# Patient Record
Sex: FEMALE | Race: WHITE
[De-identification: names, ages, dates, MRNs, and addresses within clinical notes are randomized per-mention and may not be internally consistent; named-entity substitution may affect disease eponyms.]

---

## 2017-08-28 ENCOUNTER — HOSPITAL ENCOUNTER (OUTPATIENT)
Dept: HOSPITAL 45 - C.LAB1850 | Age: 72
Discharge: HOME | End: 2017-08-28
Attending: INTERNAL MEDICINE
Payer: COMMERCIAL

## 2017-08-28 DIAGNOSIS — E78.5: ICD-10-CM

## 2017-08-28 DIAGNOSIS — I10: Primary | ICD-10-CM

## 2017-08-28 DIAGNOSIS — E89.0: ICD-10-CM

## 2017-08-28 DIAGNOSIS — Z11.59: ICD-10-CM

## 2017-08-28 DIAGNOSIS — D72.819: ICD-10-CM

## 2017-08-28 LAB
ANION GAP SERPL CALC-SCNC: 7 MMOL/L (ref 3–11)
BASOPHILS # BLD: 0.04 K/UL (ref 0–0.2)
BASOPHILS NFR BLD: 0.7 %
BUN SERPL-MCNC: 19 MG/DL (ref 7–18)
BUN/CREAT SERPL: 16.9 (ref 10–20)
CALCIUM SERPL-MCNC: 9.2 MG/DL (ref 8.5–10.1)
CHLORIDE SERPL-SCNC: 104 MMOL/L (ref 98–107)
CHOLEST/HDLC SERPL: 3.3 {RATIO}
CO2 SERPL-SCNC: 26 MMOL/L (ref 21–32)
COMPLETE: YES
CREAT SERPL-MCNC: 1.1 MG/DL (ref 0.6–1.2)
EOSINOPHIL NFR BLD AUTO: 225 K/UL (ref 130–400)
GLUCOSE SERPL-MCNC: 102 MG/DL (ref 70–99)
GLUCOSE UR QL: 77 MG/DL
HCT VFR BLD CALC: 41.2 % (ref 37–47)
IG%: 0.3 %
IMM GRANULOCYTES NFR BLD AUTO: 26.8 %
KETONES UR QL STRIP: 168 MG/DL
LYMPHOCYTES # BLD: 1.55 K/UL (ref 1.2–3.4)
MCH RBC QN AUTO: 30.9 PG (ref 25–34)
MCHC RBC AUTO-ENTMCNC: 34.2 G/DL (ref 32–36)
MCV RBC AUTO: 90.4 FL (ref 80–100)
MONOCYTES NFR BLD: 9.3 %
NEUTROPHILS # BLD AUTO: 3.3 %
NEUTROPHILS NFR BLD AUTO: 59.6 %
NITRITE UR QL STRIP: 61 MG/DL (ref 0–150)
PH UR: 257 MG/DL (ref 0–200)
PMV BLD AUTO: 9.1 FL (ref 7.4–10.4)
POTASSIUM SERPL-SCNC: 4.2 MMOL/L (ref 3.5–5.1)
RBC # BLD AUTO: 4.56 M/UL (ref 4.2–5.4)
SODIUM SERPL-SCNC: 137 MMOL/L (ref 136–145)
TSH SERPL-ACNC: 0.22 UIU/ML (ref 0.3–4.5)
VERY LOW DENSITY LIPOPROT CALC: 12 MG/DL
WBC # BLD AUTO: 5.79 K/UL (ref 4.8–10.8)

## 2017-09-19 ENCOUNTER — HOSPITAL ENCOUNTER (OUTPATIENT)
Dept: HOSPITAL 45 - C.MAMM | Age: 72
Discharge: HOME | End: 2017-09-19
Attending: OBSTETRICS & GYNECOLOGY
Payer: COMMERCIAL

## 2017-09-19 DIAGNOSIS — Z12.31: Primary | ICD-10-CM

## 2017-09-20 NOTE — MAMMOGRAPHY REPORT
BILATERAL DIGITAL SCREENING MAMMOGRAM WITH CAD: 9/19/2017

CLINICAL HISTORY: Routine screening.  Patient has no complaints.  





TECHNIQUE:  Bilateral CC and MLO views were obtained.  Current study was also evaluated with a Comput
er Aided Detection (CAD) system.  



COMPARISON: Comparison is made to exams dated:  9/15/2016 mammogram, 9/10/2015 mammogram, 9/5/2014 ma
mmogram, 8/29/2013 mammogram, 8/28/2012 mammogram, and 8/24/2011 mammogram - [facility]
nter.   



BREAST COMPOSITION:  The tissue of both breasts is almost entirely fatty.  



FINDINGS:  There are mild vascular calcifications in both breasts.  No suspicious mass, architectural
 distortion or cluster of microcalcifications is seen.  



IMPRESSION:  ACR BI-RADS CATEGORY 2: BENIGN

There is no mammographic evidence of malignancy. A 1 year screening mammogram is recommended.  The pa
tient will receive written notification of the results.  





Approximately 10% of breast cancers are not detected with mammography. A negative mammographic report
 should not delay biopsy if a clinically suggestive mass is present.



Bridgette Nation M.D.          

ay/:9/19/2017 14:57:58  



Imaging Technologist: Shea SANDOVAL(R)(M), [facility]

letter sent: Normal 1/2  

BI-RADS Code: ACR BI-RADS Category 2: Benign

## 2017-09-22 ENCOUNTER — HOSPITAL ENCOUNTER (OUTPATIENT)
Dept: HOSPITAL 45 - C.PAPS | Age: 72
Discharge: HOME | End: 2017-09-22
Attending: OBSTETRICS & GYNECOLOGY
Payer: COMMERCIAL

## 2017-09-22 DIAGNOSIS — Z85.42: Primary | ICD-10-CM

## 2017-10-26 ENCOUNTER — HOSPITAL ENCOUNTER (OUTPATIENT)
Dept: HOSPITAL 45 - C.LAB1850 | Age: 72
Discharge: HOME | End: 2017-10-26
Attending: INTERNAL MEDICINE
Payer: COMMERCIAL

## 2017-10-26 DIAGNOSIS — I10: Primary | ICD-10-CM

## 2018-02-01 ENCOUNTER — HOSPITAL ENCOUNTER (OUTPATIENT)
Dept: HOSPITAL 45 - C.GI | Age: 73
Discharge: HOME | End: 2018-02-01
Attending: INTERNAL MEDICINE
Payer: COMMERCIAL

## 2018-02-01 VITALS
WEIGHT: 183.38 LBS | HEIGHT: 62.01 IN | BODY MASS INDEX: 33.32 KG/M2 | WEIGHT: 183.38 LBS | BODY MASS INDEX: 33.32 KG/M2 | HEIGHT: 62.01 IN

## 2018-02-01 VITALS — HEART RATE: 61 BPM | SYSTOLIC BLOOD PRESSURE: 121 MMHG | DIASTOLIC BLOOD PRESSURE: 61 MMHG | OXYGEN SATURATION: 99 %

## 2018-02-01 DIAGNOSIS — K57.30: ICD-10-CM

## 2018-02-01 DIAGNOSIS — K64.8: ICD-10-CM

## 2018-02-01 DIAGNOSIS — Z12.11: Primary | ICD-10-CM

## 2018-02-01 DIAGNOSIS — Z79.82: ICD-10-CM

## 2018-02-01 DIAGNOSIS — Z79.899: ICD-10-CM

## 2018-02-01 DIAGNOSIS — Z80.0: ICD-10-CM

## 2018-02-01 NOTE — GI REPORT
Procedure Date: 2/1/2018 1:24 PM

Procedure:            Colonoscopy

Indications:          Family history of colon cancer in a first-degree 

                      relative

Medicines:            Monitored Anesthesia Care

Complications:        No immediate complications.

Estimated Blood Loss: Estimated blood loss: none.

Procedure:            Pre-Anesthesia Assessment:

                      - Prior to the procedure, a History and Physical was 

                      performed, and patient medications and allergies were 

                      reviewed. The patient's tolerance of previous 

                      anesthesia was also reviewed. The risks and benefits of 

                      the procedure and the sedation options and risks were 

                      discussed with the patient. All questions were 

                      answered, and informed consent was obtained. Prior 

                      Anticoagulants: The patient has taken aspirin, last 

                      dose was 1 day prior to procedure. ASA Grade 

                      Assessment: II - A patient with mild systemic disease. 

                      After reviewing the risks and benefits, the patient was 

                      deemed in satisfactory condition to undergo the 

                      procedure.

                      After I obtained informed consent, the scope was passed 

                      under direct vision. Throughout the procedure, the 

                      patient's blood pressure, pulse, and oxygen saturations 

                      were monitored continuously. The scope was introduced 

                      through the anus and advanced to the terminal ileum. 

                      The colonoscopy was performed without difficulty. The 

                      patient tolerated the procedure well. The quality of 

                      the bowel preparation was good. The terminal ileum, 

                      ileocecal valve, appendiceal orifice, and rectum were 

                      photographed.

Findings:

     The perianal and digital rectal examinations were normal.

     Multiple small-mouthed diverticula were found in the sigmoid colon.

     Non-bleeding internal hemorrhoids were found during retroflexion. The 

     hemorrhoids were small.

Impression:           - Diverticulosis in the sigmoid colon.

                      - Non-bleeding internal hemorrhoids.

                      - No specimens collected.

Recommendation:       - Resume previous diet.

                      - Continue present medications.

                      - Repeat colonoscopy in 5 years for surveillance.

                      - Return to primary care physician as previously 

                      scheduled.

Jairon Cedeno, DO

2/1/2018 2:11:18 PM

This report has been signed electronically.

Note Initiated On: 2/1/2018 1:24 PM

     I attest to the content of the Intraoperative Record and orders 

     documented therein, exceptions below

## 2018-02-01 NOTE — ANESTHESIOLOGY PROGRESS NOTE
Anesthesia Post Op Note


Date & Time


Feb 1, 2018 at 14:21





Vital Signs


Pain Intensity:  0





Vital Signs Past 12 Hours








  Date Time  Temp Pulse Resp B/P (MAP) Pulse Ox O2 Delivery O2 Flow Rate FiO2


 


2/1/18 13:57  63 16 105/54 (71) 96 Room Air  


 


2/1/18 12:52 36.6 82 20 162/61 (94) 96 Room Air  











Notes


Mental Status:  alert / awake / arousable, participated in evaluation


Pt Amnestic to Procedure:  Yes


Nausea / Vomiting:  adequately controlled


Pain:  adequately controlled


Airway Patency, RR, SpO2:  stable & adequate


BP & HR:  stable & adequate


Hydration State:  stable & adequate


Anesthetic Complications:  no major complications apparent

## 2018-02-01 NOTE — ENDO HISTORY AND PHYSICAL
History & Physical


Date of Service:


Feb 1, 2018.


Chief Complaint:


Screening


Referring Physician:


Dr Muse


History of Present Illness


71 yo CF who presents for screening colonoscopy.





Past Surgical History


Hx Cardiac Surgery:  No


Hx Internal Defibrillator:  No


Hx Pacemaker:  No


Hx Abdominal Surgery:  No


Hx of Implantable Prosthesis:  No


Hx Post-Op Nausea and Vomiting:  No


Hx Cancer Surgery:  Yes (JANES BSO)


Hx Thoracic Surgery:  No


Hx Orthopedic:  No


Hx Urinary Tract Surgery:  No





Family History


Colon CA





Social History


Smoking Status:  Never Smoker


Hx Substance Use:  No


Hx Alcohol Use:  Yes (RARELY)





Allergies


Coded Allergies:  


     Niacin (Verified  Allergy, Mild, RASH, 2/1/18)





Current Medications





Reported Home Medications








 Medications  Dose


 Route/Sig


 Max Daily Dose Days Date Category


 


 Aspirin Chewable


  (Aspirin) 81 Mg


 Chew  81 Mg


 PO QAM


    1/25/18 Reported


 


 Astelin Nasal


 Spray (Azelastine


 Hcl) 200


 Sprays/30 Ml Spray  1-2 Sprays


 NA HS PRN


    1/25/18 Reported


 


 Flonase Allergy


 Relief


  (Fluticasone


 Propionate


  (Nasal)) 50


 Mcg/Act Spr  1 Spray


 ANTOINETTE QAM PRN


    1/25/18 Reported


 


 Zestril


  (Lisinopril) 10


 Mg Tab  10 Mg


 PO QAM


    1/25/18 Reported


 


 Levothyroxine


 Sodium 100 Mcg Tab  1 Tab


 PO QAM


    1/25/18 Reported











Vital Signs


Weight (Kilograms):  83.18


Height (Feet):  5


Height (Inches):  2











  Date Time  Temp Pulse Resp B/P (MAP) Pulse Ox O2 Delivery O2 Flow Rate FiO2


 


2/1/18 12:52 36.6 82 20 162/61 (94) 96 Room Air  











Physical Exam


General Appearance:  WD/WN, no apparent distress


Respiratory/Chest:  


   Auscultation:  breath sounds normal


Cardiovascular:  


   Heart Auscultation:  RRR


Abdomen:  


   Bowel Sounds:  normal


   Inspection & Palpation:  soft, non-distended, no tenderness, guarding & 

rebound





Assessment and Plan


Assessment:


71 yo CF who presents for screening colonoscopy.








Plan:


Proceed with colonoscopy.

## 2018-02-01 NOTE — DISCHARGE INSTRUCTIONS
Endoscopy Patient Instructions


Date / Procedure(s) Performed


Feb 1, 2018.


Colonoscopy





Allergy Information


Coded Allergies:  


     Niacin (Verified  Allergy, Mild, RASH, 2/1/18)





Discharge Date / Findings


Feb 1, 2018.


Diverticulosis


Internal hemorrhoids





Medication Instructions


Stopped Medication(s):  


ASA 81 MG


OK to resume all medications today as prescribed





Reported Home Medications








 Medications  Dose


 Route/Sig


 Max Daily Dose Days Date Category


 


 Aspirin Chewable


  (Aspirin) 81 Mg


 Chew  81 Mg


 PO QAM


    1/25/18 Reported


 


 Astelin Nasal


 Spray (Azelastine


 Hcl) 200


 Sprays/30 Ml Spray  1-2 Sprays


 NA HS PRN


    1/25/18 Reported


 


 Flonase Allergy


 Relief


  (Fluticasone


 Propionate


  (Nasal)) 50


 Mcg/Act Spr  1 Spray


 ANTOINETTE QAM PRN


    1/25/18 Reported


 


 Zestril


  (Lisinopril) 10


 Mg Tab  10 Mg


 PO QAM


    1/25/18 Reported


 


 Levothyroxine


 Sodium 100 Mcg Tab  1 Tab


 PO QAM


    1/25/18 Reported











Provider Instructions





Activity Restrictions





-  No exercising or heavy lifting for 24 hours. 


-  Do not drink alcohol the day of the procedure.


-  Do not drive a car or operate machinery until the day after the procedure.


-  Do not make any important decisions or sign important papers in 24 hours 

after the procedure.





Following Day:





-  Return to full activity which may include returning to work/school.





Diet





Start your diet with liquids and light foods (jello, soup, juice, toast).  Then 

eat your usual diet if not nauseated.





Treatment For Common After Affects





For mild abdominal pain, bloating, or excessive gas:





-  Rest


-  Eat lightly


-  Lie on right side





Follow-Up Information


Follow-up with Dr Muse as scheduled





Anesthesia Information





What You Should Know





You have had a procedure that required some medicine to reduce anxiety and 

discomfort. This treatment is called moderate sedation.  


After receiving the treatment, you may be sleepy, but you will be able to 

breathe on your own.  The effects of the treatment may last for several hours.








Follow these instructions along with Activity/Diet recommendations noted above:





*  Do NOT do anything where dizziness or clumsiness would be dangerous.





*  Rest quietly at home today, then you can be up and about tomorrow.





*  Have a responsible person stay with you the rest of today.





*  You may have had an I.V. today.  If so, you may take the dressing off later 

today.





Recommendations


 


Call your doctor if:





*  Trouble breathing 





*  Continuous vomiting for more than 24 hours








*  Temperature above 101 degrees





*  Severe abdominal pain or bloating





*  Pain not relieved by pain medicine ordered





*  There is increased drainage or redness from any incision





*  A large amount of rectal bleeding greater than 2-3 tablespoons. 


   (If you had a polyp/s removed or have hemorrhoids, a small amount of blood -


    from the rectum is to be expected.)





*  You have any unanswered questions or concerns.








IN THE EVENT OF A SERIOUS EMERGENCY, GO TO THE NEAREST EMERGENCY ROOM








       Your discharge instructions were prepared by provider Jairon Cedeno.





 Patient Instructions Signature Page














Vickie Lawrence 











Patient (or Guardian) Signature/Date:____________________________________ I 

have read and understand the instructions given to me by my caregivers.








Caregiver/RN/Doctor Signature/Date:____________________________________











The above-named patient and/or guardian has received patient instructions on 

this date.





























+  Original Patient Signature Page (only) stays with chart.  Please make copy 

for patient.